# Patient Record
Sex: FEMALE | NOT HISPANIC OR LATINO | URBAN - METROPOLITAN AREA
[De-identification: names, ages, dates, MRNs, and addresses within clinical notes are randomized per-mention and may not be internally consistent; named-entity substitution may affect disease eponyms.]

---

## 2023-10-02 ENCOUNTER — TRANSFERRED RECORDS (OUTPATIENT)
Dept: HEALTH INFORMATION MANAGEMENT | Facility: CLINIC | Age: 58
End: 2023-10-02

## 2023-10-03 ENCOUNTER — TRANSCRIBE ORDERS (OUTPATIENT)
Dept: OTHER | Age: 58
End: 2023-10-03

## 2023-10-03 DIAGNOSIS — R56.9 UNSPECIFIED CONVULSIONS (H): Primary | ICD-10-CM

## 2023-10-12 ENCOUNTER — OFFICE VISIT (OUTPATIENT)
Dept: NEUROLOGY | Facility: CLINIC | Age: 58
End: 2023-10-12
Attending: NURSE PRACTITIONER
Payer: COMMERCIAL

## 2023-10-12 VITALS — TEMPERATURE: 97.3 F | WEIGHT: 165.23 LBS | DIASTOLIC BLOOD PRESSURE: 82 MMHG | SYSTOLIC BLOOD PRESSURE: 135 MMHG

## 2023-10-12 DIAGNOSIS — G40.909 SEIZURE DISORDER (H): Primary | ICD-10-CM

## 2023-10-12 LAB — LEVETIRACETAM SERPL-MCNC: 11.1 ΜG/ML (ref 10–40)

## 2023-10-12 PROCEDURE — 80177 DRUG SCRN QUAN LEVETIRACETAM: CPT | Mod: ORL | Performed by: PSYCHIATRY & NEUROLOGY

## 2023-10-12 RX ORDER — LEVETIRACETAM 250 MG/1
250 TABLET ORAL 2 TIMES DAILY
COMMUNITY
Start: 2023-02-02 | End: 2023-10-12

## 2023-10-12 RX ORDER — LEVETIRACETAM 500 MG/1
500 TABLET ORAL 2 TIMES DAILY
Qty: 60 TABLET | Refills: 5 | Status: SHIPPED | OUTPATIENT
Start: 2023-10-12 | End: 2024-01-18

## 2023-10-12 RX ORDER — ERGOCALCIFEROL 1.25 MG/1
50000 CAPSULE ORAL WEEKLY
COMMUNITY

## 2023-10-12 NOTE — PROGRESS NOTES
"October 12, 2023    Dear Dr. Serg Elizondo,    I had the pleasure of seeing your patient Ms. Sejal Rivas at [facility] epilepsy clinic today. As you know, this patient is a 57 year old years old right handed female presented for evaluation for seizure activities.  Patient is accompanied by her son in the clinic today who also acted as her .  She is being referred for further evaluation and management of her seizures.    Patient started to have seizures about 3 years ago when she was 54 years old when she was in [facility].  She moved to the  in 2022.  She was not treated in [facility]. She started taking Keppra 250 mg bid since Feb. 2023. She is compliant with the meds, she is complaining of some headaches. The son felt that Keppra might have helped.    Patient has 2 types of seizures.  Type # 1 is \"mild seizures\". They are described as her tongue will be twisting, speech is slurred. She is aware of things, she can follow commands. She can communicate. These were happening about 3-6 per day.  None since Sep 17th, 2023. No clear reason for this change.  Type # 2 is \"bigger seizures\".  They are described as she will be confused, humming, making load noises, she will be stiff all over, with tonic clonic movement of the extremities, which will last for 10 min. This is followed by postictal confusion. She had this type about once every 3 months.    TRIGGERS FOR SEIZURES:    None.    RISK FACTORS FOR SEIZURES:  No history of head trauma with loss of consciousness, no history of CNS infection, no history of febrile convulsions.  No history of brain tumor or stroke. Normal development.     ALLERGIES:  Patient has no known allergies.    CURRENT MEDICATIONS:  Current Outpatient Medications   Medication Sig Dispense Refill    ergocalciferol (ERGOCALCIFEROL) 1.25 MG (48707 UT) capsule Take 50,000 Units by mouth once a week      levETIRAcetam (KEPPRA) 250 MG tablet Take 250 mg by mouth 2 times daily         PAST " AEDs:  None    PAST MEDICAL HISTORY:  PTSD  Depression  Anxiety  Back pain    PAST SURGICAL HISTORY:  None    Family History:  History of seizures: None    SOCIAL HISTORY:  Born and raised: [facility].   Education: No school  Tobacco: no, ETOH: no, Drugs: no    Family: , Children: one son in the [place] who is a , all other children are in [facility].    REVIEW OF SYSTEMS:   Negative.       PHYSICAL EXAMINATIONS:     Blood pressure 135/82, temperature 97.3  F (36.3  C), temperature source Temporal, weight 165 lb 3.7 oz (74.9 kg).    General exam: General Appearance:  No acute distress.  HEENT:  Normocephalic, atraumatic.  Neck:  Supple, no lymphadenopathy. Extremities:  No edema, no clubbing, no cyanosis.      Neurologic Exam:  Alert and oriented x 2, not knowing the date.  Speech fluent, appropriate. Normal attention.  Cranial Nerves:  Pupils are equal, round, reactive to light and accomodation.  Extraocular movement intact.  No facial weakness or asymmetry.  Facial sensation was normal.  Tongue and palate midline.  Hearing normal.  Visual field : normal to confrontation.   Motor Exam:  Normal bulk.  Normal tone.  Strength 5/5 in all extremities.  Sensory:  Normal to light touch and vibration in all extremities.  Deep tendon reflexes 2+ bilaterally in both upper and lower extremities.  Coordination:  Finger-to-nose, heel-to-shin exams showed no ataxia.  Rapid alternating movement was normal.  Gait and Station:  normal casual gait.  Normal tendem gait. No difficulties with tip-toe or heel walking.  Romberg sign negative.      PREVIOUS DIAGNOSTIC TESTING:    MRI brain: (1/18/2023)  Impression:  1.  No intracranial mass, hippocampal sclerosis or acute abnormality.  2.  Small bilateral dorsal occipital cortical chronic encephalomalacia.  3.  Mild-to-moderate generalized atrophy.  4.  Moderate nonspecific but presumed chronic microvascular disease in the cerebral white matters. Additional lesser  "involvement at the brainstem with small left pontomesencephalic chronic lacunar infarct.    EEG: (1/5/2023):  Normal.       IMPRESSION:  This patient is a 57 year old years old right handed female presented with seizures for the past 3 years. She has 2 types of seizures, probably focal aware or focal impaired seizures, and focal to bilateral tonic-clonic seizures.  It seems Keppra 250 mg bid helped to reduce the seizures. Will need to increase keppra dose.    The etiology of seizures might be \" Small bilateral dorsal occipital cortical chronic encephalomalacia\" from old stroke.    We would like to have repeat EEG for further evaluation.      PLAN:  Outpatient EEG.  Increase Keppra to 500 mg bid  RTC in 3 months.      Mikayla Teixeira MD      30 min total time was spent on the day of this visit.      20 min was spent on face to face time  10 min was spent on preparation of visit to review charts and labs, ordering medications and tests, and documentation of clinical information  "

## 2023-10-12 NOTE — LETTER
"10/12/2023       RE: Farnaz Rivas  : 1965   MRN: [id number]      Dear Colleague,    Thank you for referring your patient, Farnaz Rivas, to the [facility] EPILEPSY CARE at [facility]. Please see a copy of my visit note below.    2023    Dear Dr. Serg Elizondo,    I had the pleasure of seeing your patient Ms. Sejal Rivas at [facility] epilepsy clinic today. As you know, this patient is a 57 year old years old right handed female presented for evaluation for seizure activities.  Patient is accompanied by her son in the clinic today who also acted as her .  She is being referred for further evaluation and management of her seizures.    Patient started to have seizures about 3 years ago when she was 54 years old when she was in [place].  She moved to the  in .  She was not treated in [facility]. She started taking Keppra 250 mg bid since 2023. She is compliant with the meds, she is complaining of some headaches. The son felt that Keppra might have helped.    Patient has 2 types of seizures.  Type # 1 is \"mild seizures\". They are described as her tongue will be twisting, speech is slurred. She is aware of things, she can follow commands. She can communicate. These were happening about 3-6 per day.  None since Sep 17th, 2023. No clear reason for this change.  Type # 2 is \"bigger seizures\".  They are described as she will be confused, humming, making load noises, she will be stiff all over, with tonic clonic movement of the extremities, which will last for 10 min. This is followed by postictal confusion. She had this type about once every 3 months.    TRIGGERS FOR SEIZURES:    None.    RISK FACTORS FOR SEIZURES:  No history of head trauma with loss of consciousness, no history of CNS infection, no history of febrile convulsions.  No history of brain tumor or stroke. Normal development. "     ALLERGIES:  Patient has no known allergies.    CURRENT MEDICATIONS:  Current Outpatient Medications   Medication Sig Dispense Refill    ergocalciferol (ERGOCALCIFEROL) 1.25 MG (31153 UT) capsule Take 50,000 Units by mouth once a week      levETIRAcetam (KEPPRA) 250 MG tablet Take 250 mg by mouth 2 times daily         PAST AEDs:  None    PAST MEDICAL HISTORY:  PTSD  Depression  Anxiety  Back pain    PAST SURGICAL HISTORY:  None    Family History:  History of seizures: None    SOCIAL HISTORY:  Born and raised: [facility].   Education: No school  Tobacco: no, ETOH: no, Drugs: no    Family: , Children: one son in the [place] who is a , all other children are in [facility].    REVIEW OF SYSTEMS:   Negative.       PHYSICAL EXAMINATIONS:     Blood pressure 135/82, temperature 97.3  F (36.3  C), temperature source Temporal, weight 165 lb 3.7 oz (74.9 kg).    General exam: General Appearance:  No acute distress.  HEENT:  Normocephalic, atraumatic.  Neck:  Supple, no lymphadenopathy. Extremities:  No edema, no clubbing, no cyanosis.      Neurologic Exam:  Alert and oriented x 2, not knowing the date.  Speech fluent, appropriate. Normal attention.  Cranial Nerves:  Pupils are equal, round, reactive to light and accomodation.  Extraocular movement intact.  No facial weakness or asymmetry.  Facial sensation was normal.  Tongue and palate midline.  Hearing normal.  Visual field : normal to confrontation.   Motor Exam:  Normal bulk.  Normal tone.  Strength 5/5 in all extremities.  Sensory:  Normal to light touch and vibration in all extremities.  Deep tendon reflexes 2+ bilaterally in both upper and lower extremities.  Coordination:  Finger-to-nose, heel-to-shin exams showed no ataxia.  Rapid alternating movement was normal.  Gait and Station:  normal casual gait.  Normal tendem gait. No difficulties with tip-toe or heel walking.  Romberg sign negative.      PREVIOUS DIAGNOSTIC TESTING:    MRI brain:  "(1/18/2023)  Impression:  1.  No intracranial mass, hippocampal sclerosis or acute abnormality.  2.  Small bilateral dorsal occipital cortical chronic encephalomalacia.  3.  Mild-to-moderate generalized atrophy.  4.  Moderate nonspecific but presumed chronic microvascular disease in the cerebral white matters. Additional lesser involvement at the brainstem with small left pontomesencephalic chronic lacunar infarct.    EEG: (1/5/2023):  Normal.       IMPRESSION:  This patient is a 57 year old years old right handed female presented with seizures for the past 3 years. She has 2 types of seizures, probably focal aware or focal impaired seizures, and focal to bilateral tonic-clonic seizures.  It seems Keppra 250 mg bid helped to reduce the seizures. Will need to increase keppra dose.    The etiology of seizures might be \" Small bilateral dorsal occipital cortical chronic encephalomalacia\" from old stroke.    We would like to have repeat EEG for further evaluation.      PLAN:  Outpatient EEG.  Increase Keppra to 500 mg bid  RTC in 3 months.      30 min total time was spent on the day of this visit.      20 min was spent on face to face time  10 min was spent on preparation of visit to review charts and labs, ordering medications and tests, and documentation of clinical information      Again, thank you for allowing me to participate in the care of your patient.      Sincerely,    Mikayla Teixeira MD    "

## 2023-11-06 ENCOUNTER — ANCILLARY PROCEDURE (OUTPATIENT)
Dept: NEUROLOGY | Facility: CLINIC | Age: 58
End: 2023-11-06
Attending: NURSE PRACTITIONER
Payer: COMMERCIAL

## 2023-11-06 DIAGNOSIS — G40.909 SEIZURE DISORDER (H): ICD-10-CM

## 2024-01-18 ENCOUNTER — OFFICE VISIT (OUTPATIENT)
Dept: NEUROLOGY | Facility: CLINIC | Age: 59
End: 2024-01-18
Payer: COMMERCIAL

## 2024-01-18 VITALS
DIASTOLIC BLOOD PRESSURE: 88 MMHG | SYSTOLIC BLOOD PRESSURE: 132 MMHG | TEMPERATURE: 97.2 F | HEART RATE: 56 BPM | WEIGHT: 157.2 LBS

## 2024-01-18 DIAGNOSIS — G40.909 SEIZURE DISORDER (H): ICD-10-CM

## 2024-01-18 RX ORDER — LOSARTAN POTASSIUM AND HYDROCHLOROTHIAZIDE 12.5; 5 MG/1; MG/1
1 TABLET ORAL AT BEDTIME
COMMUNITY
Start: 2022-10-29 | End: 2024-01-18

## 2024-01-18 RX ORDER — LEVETIRACETAM 500 MG/1
500 TABLET ORAL 2 TIMES DAILY
Qty: 180 TABLET | Refills: 3 | Status: SHIPPED | OUTPATIENT
Start: 2024-01-18 | End: 2024-07-18

## 2024-01-18 RX ORDER — LOSARTAN POTASSIUM AND HYDROCHLOROTHIAZIDE 12.5; 5 MG/1; MG/1
1 TABLET ORAL AT BEDTIME
Qty: 90 TABLET | Refills: 3 | Status: SHIPPED | OUTPATIENT
Start: 2024-01-18

## 2024-01-18 NOTE — PROGRESS NOTES
"CC: Follow up for seizures.    HPI:  Ms. Sejal Rivas returns for follow up.  She is a 57 year old years old right handed female presented with seizure activities.  There is a Sharelook  on the tablet.     Her daughter in law was with her in the clinic. She was last seen about 3 months ago.  Her keppra was increased to 500 mg bid since the last visit. She had no seizures since the last visit. Her last seizure was about 6-7 months ago. She is compliant with the meds. Her recent EEG was normal.    Patient started to have seizures about 3 years ago when she was 54 years old when she was in [place].  She moved to the  in 2022.  She was not treated in [facility]. She started taking Keppra 250 mg bid since Feb. 2023. She is compliant with the meds, she is complaining of some headaches. The son felt that Keppra might have helped.    Patient has 2 types of seizures.  Type # 1 is \"mild seizures\". They are described as her tongue will be twisting, speech is slurred. She is aware of things, she can follow commands. She can communicate. These were happening about 3-6 per day.  None since Sep 17th, 2023. No clear reason for this change.  Type # 2 is \"bigger seizures\".  They are described as she will be confused, humming, making load noises, she will be stiff all over, with tonic clonic movement of the extremities, which will last for 10 min. This is followed by postictal confusion. She had this type about once every 3 months.    TRIGGERS FOR SEIZURES:    None.    RISK FACTORS FOR SEIZURES:  No history of head trauma with loss of consciousness, no history of CNS infection, no history of febrile convulsions.  No history of brain tumor or stroke. Normal development.     ALLERGIES:  Patient has no known allergies.    CURRENT MEDICATIONS:  Current Outpatient Medications   Medication Sig Dispense Refill     losartan-hydrochlorothiazide (HYZAAR) 50-12.5 MG tablet Take 1 tablet by mouth at bedtime       ergocalciferol " (ERGOCALCIFEROL) 1.25 MG (26086 UT) capsule Take 50,000 Units by mouth once a week       levETIRAcetam (KEPPRA) 500 MG tablet Take 1 tablet (500 mg) by mouth 2 times daily 60 tablet 5       PAST AEDs:  None    PAST MEDICAL HISTORY:  PTSD  Depression  Anxiety  Back pain    PAST SURGICAL HISTORY:  None    Family History:  History of seizures: None    SOCIAL HISTORY:  Born and raised: [facility].   Education: No school  Tobacco: no, ETOH: no, Drugs: no    Family: , Children: one son in the [place] who is a , all other children are in [facility].    REVIEW OF SYSTEMS:   Negative.       PHYSICAL EXAMINATIONS:     Blood pressure 132/88, pulse 56, temperature 97.2  F (36.2  C), temperature source Temporal, weight 157 lb 3.2 oz (71.3 kg).    General exam: General Appearance:  No acute distress.  HEENT:  Normocephalic, atraumatic.  Neck:  Supple, no lymphadenopathy. Extremities:  No edema, no clubbing, no cyanosis.      Neurologic Exam:  Alert and oriented x 2, not knowing the date.  Speech fluent, appropriate. Normal attention.  Cranial Nerves:  Pupils are equal, round, reactive to light and accomodation.  Extraocular movement intact.  No facial weakness or asymmetry.  Facial sensation was normal.  Tongue and palate midline.  Hearing normal.  Visual field : normal to confrontation.   Motor Exam:  Normal bulk.  Normal tone.  Strength 5/5 in all extremities.  Sensory:  Normal to light touch and vibration in all extremities.  Deep tendon reflexes 2+ bilaterally in both upper and lower extremities.  Coordination:  Finger-to-nose, heel-to-shin exams showed no ataxia.  Rapid alternating movement was normal.  Gait and Station:  normal casual gait.  Normal tendem gait. No difficulties with tip-toe or heel walking.  Romberg sign negative.      PREVIOUS DIAGNOSTIC TESTING:    MRI brain: (1/18/2023)  Impression:  1.  No intracranial mass, hippocampal sclerosis or acute abnormality.  2.  Small bilateral dorsal occipital  "cortical chronic encephalomalacia.  3.  Mild-to-moderate generalized atrophy.  4.  Moderate nonspecific but presumed chronic microvascular disease in the cerebral white matters. Additional lesser involvement at the brainstem with small left pontomesencephalic chronic lacunar infarct.    EEG: (1/5/2023):  Normal.       IMPRESSION:      Epilepsy. This patient is a 57 year old years old right handed female presented with seizures for the past 3 years. She has 2 types of seizures, probably focal aware or focal impaired seizures, and focal to bilateral tonic-clonic seizures.  It seems Keppra  helped to control seizures.    The etiology of seizures might be \" Small bilateral dorsal occipital cortical chronic encephalomalacia\" from old stroke.    Her daughter in law was with her in the clinic. She was last seen about 3 months ago.  Her keppra was increased to 500 mg bid since the last visit. She had no seizures since the last visit. Her last seizure was about 6-7 months ago. She is compliant with the meds.     PLAN:  1. Continue Keppra to 500 mg bid. Refilled.  2. RTC in 6 months. If continue to have seizures, may increase keppra. Other options are Lamictal, Vimpat.      30 min total time was spent on the day of this visit.      20 min was spent on face to face time  10 min was spent on preparation of visit to review charts and labs, ordering medications and tests, and documentation of clinical information  "

## 2024-01-18 NOTE — LETTER
"2024       RE: Farnaz Rivas  : 1965   MRN: [id number]      Dear Colleague,    Thank you for referring your patient, Farnaz Rivas, to the [facility] EPILEPSY CARE at . Please see a copy of my visit note below.    CC: Follow up for seizures.    HPI:  Ms. Sejal Rivas returns for follow up.  She is a 57 year old years old right handed female presented with seizure activities.  There is a QuickBlox  on the tablet.     Her daughter in law was with her in the clinic. She was last seen about 3 months ago.  Her keppra was increased to 500 mg bid since the last visit. She had no seizures since the last visit. Her last seizure was about 6-7 months ago. She is compliant with the meds. Her recent EEG was normal.    Patient started to have seizures about 3 years ago when she was 54 years old when she was in [place].  She moved to the  in .  She was not treated in [facility]. She started taking Keppra 250 mg bid since 2023. She is compliant with the meds, she is complaining of some headaches. The son felt that Keppra might have helped.    Patient has 2 types of seizures.  Type # 1 is \"mild seizures\". They are described as her tongue will be twisting, speech is slurred. She is aware of things, she can follow commands. She can communicate. These were happening about 3-6 per day.  None since Sep 17th, 2023. No clear reason for this change.  Type # 2 is \"bigger seizures\".  They are described as she will be confused, humming, making load noises, she will be stiff all over, with tonic clonic movement of the extremities, which will last for 10 min. This is followed by postictal confusion. She had this type about once every 3 months.    TRIGGERS FOR SEIZURES:    None.    RISK FACTORS FOR SEIZURES:  No history of head trauma with loss of consciousness, no history of CNS infection, no history of febrile convulsions.  No " history of brain tumor or stroke. Normal development.     ALLERGIES:  Patient has no known allergies.    CURRENT MEDICATIONS:  Current Outpatient Medications   Medication Sig Dispense Refill    losartan-hydrochlorothiazide (HYZAAR) 50-12.5 MG tablet Take 1 tablet by mouth at bedtime      ergocalciferol (ERGOCALCIFEROL) 1.25 MG (37760 UT) capsule Take 50,000 Units by mouth once a week      levETIRAcetam (KEPPRA) 500 MG tablet Take 1 tablet (500 mg) by mouth 2 times daily 60 tablet 5       PAST AEDs:  None    PAST MEDICAL HISTORY:  PTSD  Depression  Anxiety  Back pain    PAST SURGICAL HISTORY:  None    Family History:  History of seizures: None    SOCIAL HISTORY:  Born and raised: [facility].   Education: No school  Tobacco: no, ETOH: no, Drugs: no    Family: , Children: one son in the  who is a , all other children are in [facility].    REVIEW OF SYSTEMS:   Negative.       PHYSICAL EXAMINATIONS:     Blood pressure 132/88, pulse 56, temperature 97.2  F (36.2  C), temperature source Temporal, weight 157 lb 3.2 oz (71.3 kg).    General exam: General Appearance:  No acute distress.  HEENT:  Normocephalic, atraumatic.  Neck:  Supple, no lymphadenopathy. Extremities:  No edema, no clubbing, no cyanosis.      Neurologic Exam:  Alert and oriented x 2, not knowing the date.  Speech fluent, appropriate. Normal attention.  Cranial Nerves:  Pupils are equal, round, reactive to light and accomodation.  Extraocular movement intact.  No facial weakness or asymmetry.  Facial sensation was normal.  Tongue and palate midline.  Hearing normal.  Visual field : normal to confrontation.   Motor Exam:  Normal bulk.  Normal tone.  Strength 5/5 in all extremities.  Sensory:  Normal to light touch and vibration in all extremities.  Deep tendon reflexes 2+ bilaterally in both upper and lower extremities.  Coordination:  Finger-to-nose, heel-to-shin exams showed no ataxia.  Rapid alternating movement was normal.  Gait and  "Station:  normal casual gait.  Normal tendem gait. No difficulties with tip-toe or heel walking.  Romberg sign negative.      PREVIOUS DIAGNOSTIC TESTING:    MRI brain: (1/18/2023)  Impression:  1.  No intracranial mass, hippocampal sclerosis or acute abnormality.  2.  Small bilateral dorsal occipital cortical chronic encephalomalacia.  3.  Mild-to-moderate generalized atrophy.  4.  Moderate nonspecific but presumed chronic microvascular disease in the cerebral white matters. Additional lesser involvement at the brainstem with small left pontomesencephalic chronic lacunar infarct.    EEG: (1/5/2023):  Normal.       IMPRESSION:      Epilepsy. This patient is a 57 year old years old right handed female presented with seizures for the past 3 years. She has 2 types of seizures, probably focal aware or focal impaired seizures, and focal to bilateral tonic-clonic seizures.  It seems Keppra  helped to control seizures.    The etiology of seizures might be \" Small bilateral dorsal occipital cortical chronic encephalomalacia\" from old stroke.    Her daughter in law was with her in the clinic. She was last seen about 3 months ago.  Her keppra was increased to 500 mg bid since the last visit. She had no seizures since the last visit. Her last seizure was about 6-7 months ago. She is compliant with the meds.     PLAN:  Continue Keppra to 500 mg bid. Refilled.  RTC in 6 months. If continue to have seizures, may increase keppra. Other options are Lamictal, Vimpat.      30 min total time was spent on the day of this visit.      20 min was spent on face to face time  10 min was spent on preparation of visit to review charts and labs, ordering medications and tests, and documentation of clinical information        Again, thank you for allowing me to participate in the care of your patient.      Sincerely,    iMkayla Teixeira MD      "

## 2024-06-07 ENCOUNTER — LAB REQUISITION (OUTPATIENT)
Dept: LAB | Facility: CLINIC | Age: 59
End: 2024-06-07
Payer: COMMERCIAL

## 2024-06-07 DIAGNOSIS — I69.919 UNSPECIFIED SYMPTOMS AND SIGNS INVOLVING COGNITIVE FUNCTIONS FOLLOWING UNSPECIFIED CEREBROVASCULAR DISEASE: ICD-10-CM

## 2024-06-08 ENCOUNTER — LAB REQUISITION (OUTPATIENT)
Dept: LAB | Facility: CLINIC | Age: 59
End: 2024-06-08
Payer: COMMERCIAL

## 2024-06-08 DIAGNOSIS — R79.1 ABNORMAL COAGULATION PROFILE: ICD-10-CM

## 2024-06-08 LAB — INR PPP: 1.04 (ref 0.85–1.15)

## 2024-06-08 PROCEDURE — 85610 PROTHROMBIN TIME: CPT | Mod: ORL | Performed by: INTERNAL MEDICINE

## 2024-06-10 LAB — INR PPP: 1.09 (ref 0.85–1.15)

## 2024-06-10 PROCEDURE — P9604 ONE-WAY ALLOW PRORATED TRIP: HCPCS | Mod: ORL | Performed by: INTERNAL MEDICINE

## 2024-06-10 PROCEDURE — 85610 PROTHROMBIN TIME: CPT | Mod: ORL | Performed by: INTERNAL MEDICINE

## 2024-06-10 PROCEDURE — 36415 COLL VENOUS BLD VENIPUNCTURE: CPT | Mod: ORL | Performed by: INTERNAL MEDICINE

## 2024-06-12 ENCOUNTER — LAB REQUISITION (OUTPATIENT)
Dept: LAB | Facility: CLINIC | Age: 59
End: 2024-06-12
Payer: COMMERCIAL

## 2024-06-12 DIAGNOSIS — D69.6 THROMBOCYTOPENIA, UNSPECIFIED (H): ICD-10-CM

## 2024-06-12 DIAGNOSIS — I69.919 UNSPECIFIED SYMPTOMS AND SIGNS INVOLVING COGNITIVE FUNCTIONS FOLLOWING UNSPECIFIED CEREBROVASCULAR DISEASE: ICD-10-CM

## 2024-06-13 LAB
INR PPP: 1.64 (ref 0.85–1.15)
PLATELET # BLD AUTO: 336 10E3/UL (ref 150–450)

## 2024-06-13 PROCEDURE — 36415 COLL VENOUS BLD VENIPUNCTURE: CPT | Mod: ORL | Performed by: INTERNAL MEDICINE

## 2024-06-13 PROCEDURE — 85049 AUTOMATED PLATELET COUNT: CPT | Mod: ORL | Performed by: INTERNAL MEDICINE

## 2024-06-13 PROCEDURE — P9604 ONE-WAY ALLOW PRORATED TRIP: HCPCS | Mod: ORL | Performed by: INTERNAL MEDICINE

## 2024-06-13 PROCEDURE — 85610 PROTHROMBIN TIME: CPT | Mod: ORL | Performed by: INTERNAL MEDICINE

## 2024-06-14 ENCOUNTER — LAB REQUISITION (OUTPATIENT)
Dept: LAB | Facility: CLINIC | Age: 59
End: 2024-06-14
Payer: COMMERCIAL

## 2024-06-14 DIAGNOSIS — D69.6 THROMBOCYTOPENIA, UNSPECIFIED (H): ICD-10-CM

## 2024-06-14 DIAGNOSIS — I69.910 ATTENTION AND CONCENTRATION DEFICIT FOLLOWING UNSPECIFIED CEREBROVASCULAR DISEASE: ICD-10-CM

## 2024-06-17 LAB
INR PPP: 1.92 (ref 0.85–1.15)
PLATELET # BLD AUTO: 260 10E3/UL (ref 150–450)

## 2024-06-17 PROCEDURE — 85610 PROTHROMBIN TIME: CPT | Mod: ORL | Performed by: INTERNAL MEDICINE

## 2024-06-17 PROCEDURE — P9604 ONE-WAY ALLOW PRORATED TRIP: HCPCS | Mod: ORL | Performed by: INTERNAL MEDICINE

## 2024-06-17 PROCEDURE — 36415 COLL VENOUS BLD VENIPUNCTURE: CPT | Mod: ORL | Performed by: INTERNAL MEDICINE

## 2024-06-17 PROCEDURE — 85049 AUTOMATED PLATELET COUNT: CPT | Mod: ORL | Performed by: INTERNAL MEDICINE

## 2024-06-27 ENCOUNTER — LAB REQUISITION (OUTPATIENT)
Dept: LAB | Facility: CLINIC | Age: 59
End: 2024-06-27
Payer: COMMERCIAL

## 2024-06-27 DIAGNOSIS — I63.9 CEREBRAL INFARCTION, UNSPECIFIED (H): ICD-10-CM

## 2024-06-28 LAB — INR PPP: 0.98 (ref 0.85–1.15)

## 2024-06-28 PROCEDURE — 85610 PROTHROMBIN TIME: CPT | Mod: ORL | Performed by: INTERNAL MEDICINE

## 2024-06-28 PROCEDURE — 36415 COLL VENOUS BLD VENIPUNCTURE: CPT | Mod: ORL | Performed by: INTERNAL MEDICINE

## 2024-06-28 PROCEDURE — P9604 ONE-WAY ALLOW PRORATED TRIP: HCPCS | Mod: ORL | Performed by: INTERNAL MEDICINE

## 2024-07-04 ENCOUNTER — LAB REQUISITION (OUTPATIENT)
Dept: LAB | Facility: CLINIC | Age: 59
End: 2024-07-04
Payer: COMMERCIAL

## 2024-07-04 DIAGNOSIS — I10 ESSENTIAL (PRIMARY) HYPERTENSION: ICD-10-CM

## 2024-07-04 DIAGNOSIS — D64.9 ANEMIA, UNSPECIFIED: ICD-10-CM

## 2024-07-08 LAB
ANION GAP SERPL CALCULATED.3IONS-SCNC: 13 MMOL/L (ref 7–15)
BUN SERPL-MCNC: 14.8 MG/DL (ref 6–20)
CALCIUM SERPL-MCNC: 9.9 MG/DL (ref 8.6–10)
CHLORIDE SERPL-SCNC: 99 MMOL/L (ref 98–107)
CREAT SERPL-MCNC: 0.82 MG/DL (ref 0.51–0.95)
DEPRECATED HCO3 PLAS-SCNC: 25 MMOL/L (ref 22–29)
EGFRCR SERPLBLD CKD-EPI 2021: 82 ML/MIN/1.73M2
GLUCOSE SERPL-MCNC: 156 MG/DL (ref 70–99)
HGB BLD-MCNC: 10.2 G/DL (ref 11.7–15.7)
POTASSIUM SERPL-SCNC: 4.2 MMOL/L (ref 3.4–5.3)
SODIUM SERPL-SCNC: 137 MMOL/L (ref 135–145)

## 2024-07-08 PROCEDURE — P9604 ONE-WAY ALLOW PRORATED TRIP: HCPCS | Mod: ORL

## 2024-07-08 PROCEDURE — 85018 HEMOGLOBIN: CPT | Mod: ORL

## 2024-07-08 PROCEDURE — 80048 BASIC METABOLIC PNL TOTAL CA: CPT | Mod: ORL

## 2024-07-08 PROCEDURE — 36415 COLL VENOUS BLD VENIPUNCTURE: CPT | Mod: ORL

## 2024-07-12 ENCOUNTER — LAB REQUISITION (OUTPATIENT)
Dept: LAB | Facility: CLINIC | Age: 59
End: 2024-07-12
Payer: COMMERCIAL

## 2024-07-12 DIAGNOSIS — D62 ACUTE POSTHEMORRHAGIC ANEMIA: ICD-10-CM

## 2024-07-15 LAB
ANION GAP SERPL CALCULATED.3IONS-SCNC: 11 MMOL/L (ref 7–15)
BUN SERPL-MCNC: 17 MG/DL (ref 6–20)
CALCIUM SERPL-MCNC: 9.6 MG/DL (ref 8.6–10)
CHLORIDE SERPL-SCNC: 105 MMOL/L (ref 98–107)
CREAT SERPL-MCNC: 0.76 MG/DL (ref 0.51–0.95)
EGFRCR SERPLBLD CKD-EPI 2021: 90 ML/MIN/1.73M2
ERYTHROCYTE [DISTWIDTH] IN BLOOD BY AUTOMATED COUNT: 13.9 % (ref 10–15)
GLUCOSE SERPL-MCNC: 140 MG/DL (ref 70–99)
HCO3 SERPL-SCNC: 25 MMOL/L (ref 22–29)
HCT VFR BLD AUTO: 30.8 % (ref 35–47)
HGB BLD-MCNC: 9.3 G/DL (ref 11.7–15.7)
MCH RBC QN AUTO: 26.2 PG (ref 26.5–33)
MCHC RBC AUTO-ENTMCNC: 30.2 G/DL (ref 31.5–36.5)
MCV RBC AUTO: 87 FL (ref 78–100)
PLATELET # BLD AUTO: 315 10E3/UL (ref 150–450)
POTASSIUM SERPL-SCNC: 4.2 MMOL/L (ref 3.4–5.3)
RBC # BLD AUTO: 3.55 10E6/UL (ref 3.8–5.2)
SODIUM SERPL-SCNC: 141 MMOL/L (ref 135–145)
WBC # BLD AUTO: 6.8 10E3/UL (ref 4–11)

## 2024-07-15 PROCEDURE — 80048 BASIC METABOLIC PNL TOTAL CA: CPT | Mod: ORL | Performed by: INTERNAL MEDICINE

## 2024-07-15 PROCEDURE — 36415 COLL VENOUS BLD VENIPUNCTURE: CPT | Mod: ORL | Performed by: INTERNAL MEDICINE

## 2024-07-15 PROCEDURE — 85027 COMPLETE CBC AUTOMATED: CPT | Mod: ORL | Performed by: INTERNAL MEDICINE

## 2024-07-15 PROCEDURE — P9604 ONE-WAY ALLOW PRORATED TRIP: HCPCS | Mod: ORL | Performed by: INTERNAL MEDICINE

## 2024-07-18 ENCOUNTER — VIRTUAL VISIT (OUTPATIENT)
Dept: NEUROLOGY | Facility: CLINIC | Age: 59
End: 2024-07-18
Payer: COMMERCIAL

## 2024-07-18 ENCOUNTER — LAB REQUISITION (OUTPATIENT)
Dept: LAB | Facility: CLINIC | Age: 59
End: 2024-07-18
Payer: COMMERCIAL

## 2024-07-18 DIAGNOSIS — G40.909 SEIZURE DISORDER (H): ICD-10-CM

## 2024-07-18 DIAGNOSIS — M25.50 PAIN IN UNSPECIFIED JOINT: ICD-10-CM

## 2024-07-18 RX ORDER — LEVETIRACETAM 500 MG/1
500 TABLET ORAL 2 TIMES DAILY
Qty: 180 TABLET | Refills: 3 | Status: SHIPPED | OUTPATIENT
Start: 2024-07-18

## 2024-07-18 NOTE — PROGRESS NOTES
"CC: Follow up for seizures.    HPI:  Video call for follow up.  She is a 5 year old years old right handed female with history of seizures.  Her son was with her today on the video visit, and served as . [facility].     She was last seen about 6 months ago.  She had no seizures. She was on Keppra 500 mg bid. She had no seizures since the last visit. She had no seizures since she was on Keppra 500 mg bid.. Last seizures was in summer of 2023. She is compliant with the meds. Her recent EEG was normal.     Unfortunately, she had a stroke on June 3rd. She is still in rehab.  With recent stroke, she had multiple non specific complaints but does have subjective right arm weakness and numbness, as well as possible expressive asphasia. Work up showed a small subacute stroke involving the left MCA territory and severe left ICA stenosis.     Patient started to have seizures about 3 years ago when she was 54 years old when she was in [facility].  She moved to the  in 2022.  She was not treated in [facility]. She started taking Keppra 250 mg bid since Feb. 2023. She is compliant with the meds, she is complaining of some headaches. The son felt that Keppra might have helped.    Patient has 2 types of seizures.  Type # 1 is \"mild seizures\". They are described as her tongue will be twisting, speech is slurred. She is aware of things, she can follow commands. She can communicate. These were happening about 3-6 per day.  None since Sep 17th, 2023. No clear reason for this change.  Type # 2 is \"bigger seizures\".  They are described as she will be confused, humming, making load noises, she will be stiff all over, with tonic clonic movement of the extremities, which will last for 10 min. This is followed by postictal confusion. She had this type about once every 3 months.    TRIGGERS FOR SEIZURES:    None.    RISK FACTORS FOR SEIZURES:  No history of head trauma with loss of " "consciousness, no history of CNS infection, no history of febrile convulsions.  No history of brain tumor or stroke. Normal development.     ALLERGIES:  Patient has no known allergies.    CURRENT MEDICATIONS:  Current Outpatient Medications   Medication Sig Dispense Refill    ergocalciferol (ERGOCALCIFEROL) 1.25 MG (33321 UT) capsule Take 50,000 Units by mouth once a week      levETIRAcetam (KEPPRA) 500 MG tablet Take 1 tablet (500 mg) by mouth 2 times daily 180 tablet 3    losartan-hydrochlorothiazide (HYZAAR) 50-12.5 MG tablet Take 1 tablet by mouth at bedtime 90 tablet 3       PAST AEDs:  None    PAST MEDICAL HISTORY:  PTSD  Depression  Anxiety  Back pain    PAST SURGICAL HISTORY:  None    Family History:  History of seizures: None    SOCIAL HISTORY:  Born and raised: [facility].   Education: No school  Tobacco: no, ETOH: no, Drugs: no    Family: , Children: one son in the  who is a , all other children are in [facility].    REVIEW OF SYSTEMS:   Negative.       PHYSICAL EXAMINATIONS:       AAO x 3, expressive aphasia. Hearing normal. No facial weakness.    PREVIOUS DIAGNOSTIC TESTING:    MRI brain: (1/18/2023)  Impression:  1.  No intracranial mass, hippocampal sclerosis or acute abnormality.  2.  Small bilateral dorsal occipital cortical chronic encephalomalacia.  3.  Mild-to-moderate generalized atrophy.  4.  Moderate nonspecific but presumed chronic microvascular disease in the cerebral white matters. Additional lesser involvement at the brainstem with small left pontomesencephalic chronic lacunar infarct.    EEG: (1/5/2023):  Normal.       IMPRESSION:      1.  Epilepsy. This patient is a 57 year old years old right handed female presented with seizures for the past 3 years. She has 2 types of seizures, probably focal aware or focal impaired seizures, and focal to bilateral tonic-clonic seizures.  It seems Keppra  helped to control seizures.    The etiology of seizures might be \" Small " "bilateral dorsal occipital cortical chronic encephalomalacia\" from old stroke.    She was last seen about 6 months ago.  She had no seizures. She was on Keppra 500 mg bid. She had no seizures since the last visit. She had no seizures since she was on Keppra 500 mg bid.. Last seizures was in summer of 2023. She is compliant with the meds. Her recent EEG was normal.     2. Stroke. With recent stroke, she had multiple non specific complaints but does have subjective right arm weakness and numbness, as well as possible expressive asphasia. Work up showed a small subacute stroke involving the left MCA territory and severe left ICA stenosis.     PLAN:  Continue Keppra to 500 mg bid. Refilled.  RTC in 6 months. If continue to have seizures, may increase keppra. Other options are Lamictal, Vimpat.      The longitudinal plan of care for seizures was addressed during this visit. Due to the added complexity in care, I will continue to support this patient in the subsequent management of this condition and with the ongoing continuity of care of this condition.       33 min total time was spent on the day of this visit.      25 min was spent on face to face time  8 min was spent on preparation of visit to review charts and labs, ordering medications and tests, and documentation of clinical information  "

## 2024-07-18 NOTE — LETTER
"2024       RE: Farnaz Rivas  : 1965   MRN: [id number]      Dear Colleague,    Thank you for referring your patient, Farnaz Rivas, to the [facility] EPILEPSY CARE at [facility]. Please see a copy of my visit note below.    CC: Follow up for seizures.    HPI:  Video call for follow up.  She is a 5 year old years old right handed female with history of seizures.  Her son was with her today on the video visit, and served as . There is a [employer] ([facility])  on the tablet.     She was last seen about 6 months ago.  She had no seizures. She was on Keppra 500 mg bid. She had no seizures since the last visit. She had no seizures since she was on Keppra 500 mg bid.. Last seizures was in summer of 2023. She is compliant with the meds. Her recent EEG was normal.     Unfortunately, she had a stroke on . She is still in rehab.  With recent stroke, she had multiple non specific complaints but does have subjective right arm weakness and numbness, as well as possible expressive asphasia. Work up showed a small subacute stroke involving the left MCA territory and severe left ICA stenosis.     Patient started to have seizures about 3 years ago when she was 54 years old when she was in [place].  She moved to the  in .  She was not treated in [facility]. She started taking Keppra 250 mg bid since 2023. She is compliant with the meds, she is complaining of some headaches. The son felt that Keppra might have helped.    Patient has 2 types of seizures.  Type # 1 is \"mild seizures\". They are described as her tongue will be twisting, speech is slurred. She is aware of things, she can follow commands. She can communicate. These were happening about 3-6 per day.  None since Sep 17th, 2023. No clear reason for this change.  Type # 2 is \"bigger seizures\".  They are described as she will be confused, humming, " making load noises, she will be stiff all over, with tonic clonic movement of the extremities, which will last for 10 min. This is followed by postictal confusion. She had this type about once every 3 months.    TRIGGERS FOR SEIZURES:    None.    RISK FACTORS FOR SEIZURES:  No history of head trauma with loss of consciousness, no history of CNS infection, no history of febrile convulsions.  No history of brain tumor or stroke. Normal development.     ALLERGIES:  Patient has no known allergies.    CURRENT MEDICATIONS:  Current Outpatient Medications   Medication Sig Dispense Refill     ergocalciferol (ERGOCALCIFEROL) 1.25 MG (78319 UT) capsule Take 50,000 Units by mouth once a week       levETIRAcetam (KEPPRA) 500 MG tablet Take 1 tablet (500 mg) by mouth 2 times daily 180 tablet 3     losartan-hydrochlorothiazide (HYZAAR) 50-12.5 MG tablet Take 1 tablet by mouth at bedtime 90 tablet 3       PAST AEDs:  None    PAST MEDICAL HISTORY:  PTSD  Depression  Anxiety  Back pain    PAST SURGICAL HISTORY:  None    Family History:  History of seizures: None    SOCIAL HISTORY:  Born and raised: [facility].   Education: No school  Tobacco: no, ETOH: no, Drugs: no    Family: , Children: one son in the  who is a , all other children are in [facility].    REVIEW OF SYSTEMS:   Negative.       PHYSICAL EXAMINATIONS:       AAO x 3, expressive aphasia. Hearing normal. No facial weakness.    PREVIOUS DIAGNOSTIC TESTING:    MRI brain: (1/18/2023)  Impression:  1.  No intracranial mass, hippocampal sclerosis or acute abnormality.  2.  Small bilateral dorsal occipital cortical chronic encephalomalacia.  3.  Mild-to-moderate generalized atrophy.  4.  Moderate nonspecific but presumed chronic microvascular disease in the cerebral white matters. Additional lesser involvement at the brainstem with small left pontomesencephalic chronic lacunar infarct.    EEG: (1/5/2023):  Normal.       IMPRESSION:      1.  Epilepsy. This  "patient is a 57 year old years old right handed female presented with seizures for the past 3 years. She has 2 types of seizures, probably focal aware or focal impaired seizures, and focal to bilateral tonic-clonic seizures.  It seems Keppra  helped to control seizures.    The etiology of seizures might be \" Small bilateral dorsal occipital cortical chronic encephalomalacia\" from old stroke.    She was last seen about 6 months ago.  She had no seizures. She was on Keppra 500 mg bid. She had no seizures since the last visit. She had no seizures since she was on Keppra 500 mg bid.. Last seizures was in summer of 2023. She is compliant with the meds. Her recent EEG was normal.     2. Stroke. With recent stroke, she had multiple non specific complaints but does have subjective right arm weakness and numbness, as well as possible expressive asphasia. Work up showed a small subacute stroke involving the left MCA territory and severe left ICA stenosis.     PLAN:  Continue Keppra to 500 mg bid. Refilled.  RTC in 6 months. If continue to have seizures, may increase keppra. Other options are Lamictal, Vimpat.      The longitudinal plan of care for seizures was addressed during this visit. Due to the added complexity in care, I will continue to support this patient in the subsequent management of this condition and with the ongoing continuity of care of this condition.       33 min total time was spent on the day of this visit.      25 min was spent on face to face time  8 min was spent on preparation of visit to review charts and labs, ordering medications and tests, and documentation of clinical information      Again, thank you for allowing me to participate in the care of your patient.      Sincerely,    Mikayla Teixeira MD    "

## 2024-07-19 LAB
CRP SERPL-MCNC: <3 MG/L
ERYTHROCYTE [SEDIMENTATION RATE] IN BLOOD BY WESTERGREN METHOD: 47 MM/HR (ref 0–30)
HBV SURFACE AB SERPL IA-ACNC: <3.5 M[IU]/ML
HBV SURFACE AB SERPL IA-ACNC: NONREACTIVE M[IU]/ML
HCV AB SERPL QL IA: REACTIVE
RHEUMATOID FACT SERPL-ACNC: <10 IU/ML
TSH SERPL DL<=0.005 MIU/L-ACNC: 2.92 UIU/ML (ref 0.3–4.2)
URATE SERPL-MCNC: 5.2 MG/DL (ref 2.4–5.7)

## 2024-07-19 PROCEDURE — 85652 RBC SED RATE AUTOMATED: CPT | Mod: ORL | Performed by: INTERNAL MEDICINE

## 2024-07-19 PROCEDURE — 86200 CCP ANTIBODY: CPT | Mod: ORL | Performed by: INTERNAL MEDICINE

## 2024-07-19 PROCEDURE — P9604 ONE-WAY ALLOW PRORATED TRIP: HCPCS | Mod: ORL | Performed by: INTERNAL MEDICINE

## 2024-07-19 PROCEDURE — 86706 HEP B SURFACE ANTIBODY: CPT | Mod: ORL | Performed by: INTERNAL MEDICINE

## 2024-07-19 PROCEDURE — 84443 ASSAY THYROID STIM HORMONE: CPT | Mod: ORL | Performed by: INTERNAL MEDICINE

## 2024-07-19 PROCEDURE — 86140 C-REACTIVE PROTEIN: CPT | Mod: ORL | Performed by: INTERNAL MEDICINE

## 2024-07-19 PROCEDURE — 36415 COLL VENOUS BLD VENIPUNCTURE: CPT | Mod: ORL | Performed by: INTERNAL MEDICINE

## 2024-07-19 PROCEDURE — 86431 RHEUMATOID FACTOR QUANT: CPT | Mod: ORL | Performed by: INTERNAL MEDICINE

## 2024-07-19 PROCEDURE — 86803 HEPATITIS C AB TEST: CPT | Mod: ORL | Performed by: INTERNAL MEDICINE

## 2024-07-19 PROCEDURE — 86038 ANTINUCLEAR ANTIBODIES: CPT | Mod: ORL | Performed by: INTERNAL MEDICINE

## 2024-07-19 PROCEDURE — 84550 ASSAY OF BLOOD/URIC ACID: CPT | Mod: ORL | Performed by: INTERNAL MEDICINE

## 2024-07-22 LAB
ANA SER QL IF: NEGATIVE
CCP AB SER IA-ACNC: 1.2 U/ML

## 2024-07-26 ENCOUNTER — LAB REQUISITION (OUTPATIENT)
Dept: LAB | Facility: CLINIC | Age: 59
End: 2024-07-26
Payer: COMMERCIAL

## 2024-07-26 DIAGNOSIS — D64.9 ANEMIA, UNSPECIFIED: ICD-10-CM

## 2024-07-29 LAB
ANION GAP SERPL CALCULATED.3IONS-SCNC: 16 MMOL/L (ref 7–15)
BUN SERPL-MCNC: 16.6 MG/DL (ref 6–20)
CALCIUM SERPL-MCNC: 9.8 MG/DL (ref 8.8–10.4)
CHLORIDE SERPL-SCNC: 105 MMOL/L (ref 98–107)
CREAT SERPL-MCNC: 0.66 MG/DL (ref 0.51–0.95)
EGFRCR SERPLBLD CKD-EPI 2021: >90 ML/MIN/1.73M2
ERYTHROCYTE [DISTWIDTH] IN BLOOD BY AUTOMATED COUNT: 14.6 % (ref 10–15)
GLUCOSE SERPL-MCNC: 139 MG/DL (ref 70–99)
HCO3 SERPL-SCNC: 16 MMOL/L (ref 22–29)
HCT VFR BLD AUTO: 31.5 % (ref 35–47)
HGB BLD-MCNC: 9.6 G/DL (ref 11.7–15.7)
MCH RBC QN AUTO: 25.7 PG (ref 26.5–33)
MCHC RBC AUTO-ENTMCNC: 30.5 G/DL (ref 31.5–36.5)
MCV RBC AUTO: 85 FL (ref 78–100)
PLATELET # BLD AUTO: 288 10E3/UL (ref 150–450)
POTASSIUM SERPL-SCNC: 5.1 MMOL/L (ref 3.4–5.3)
RBC # BLD AUTO: 3.73 10E6/UL (ref 3.8–5.2)
SODIUM SERPL-SCNC: 137 MMOL/L (ref 135–145)
WBC # BLD AUTO: 6.8 10E3/UL (ref 4–11)

## 2024-07-29 PROCEDURE — 36415 COLL VENOUS BLD VENIPUNCTURE: CPT | Mod: ORL | Performed by: INTERNAL MEDICINE

## 2024-07-29 PROCEDURE — 80048 BASIC METABOLIC PNL TOTAL CA: CPT | Mod: ORL | Performed by: INTERNAL MEDICINE

## 2024-07-29 PROCEDURE — 85027 COMPLETE CBC AUTOMATED: CPT | Mod: ORL | Performed by: INTERNAL MEDICINE

## 2024-07-29 PROCEDURE — P9604 ONE-WAY ALLOW PRORATED TRIP: HCPCS | Mod: ORL | Performed by: INTERNAL MEDICINE

## 2024-10-16 ENCOUNTER — LAB REQUISITION (OUTPATIENT)
Dept: LAB | Facility: CLINIC | Age: 59
End: 2024-10-16
Payer: COMMERCIAL

## 2024-10-16 DIAGNOSIS — D64.9 ANEMIA, UNSPECIFIED: ICD-10-CM

## 2024-10-16 DIAGNOSIS — I10 ESSENTIAL (PRIMARY) HYPERTENSION: ICD-10-CM

## 2024-10-21 LAB
ANION GAP SERPL CALCULATED.3IONS-SCNC: 13 MMOL/L (ref 7–15)
BUN SERPL-MCNC: 23.7 MG/DL (ref 6–20)
CALCIUM SERPL-MCNC: 10.1 MG/DL (ref 8.8–10.4)
CHLORIDE SERPL-SCNC: 103 MMOL/L (ref 98–107)
CREAT SERPL-MCNC: 0.66 MG/DL (ref 0.51–0.95)
EGFRCR SERPLBLD CKD-EPI 2021: >90 ML/MIN/1.73M2
ERYTHROCYTE [DISTWIDTH] IN BLOOD BY AUTOMATED COUNT: 15.9 % (ref 10–15)
GLUCOSE SERPL-MCNC: 148 MG/DL (ref 70–99)
HCO3 SERPL-SCNC: 25 MMOL/L (ref 22–29)
HCT VFR BLD AUTO: 35.4 % (ref 35–47)
HGB BLD-MCNC: 11.1 G/DL (ref 11.7–15.7)
MCH RBC QN AUTO: 26.5 PG (ref 26.5–33)
MCHC RBC AUTO-ENTMCNC: 31.4 G/DL (ref 31.5–36.5)
MCV RBC AUTO: 85 FL (ref 78–100)
PLATELET # BLD AUTO: 313 10E3/UL (ref 150–450)
POTASSIUM SERPL-SCNC: 4.4 MMOL/L (ref 3.4–5.3)
RBC # BLD AUTO: 4.19 10E6/UL (ref 3.8–5.2)
SODIUM SERPL-SCNC: 141 MMOL/L (ref 135–145)
WBC # BLD AUTO: 6.6 10E3/UL (ref 4–11)

## 2024-10-21 PROCEDURE — 85027 COMPLETE CBC AUTOMATED: CPT | Mod: ORL | Performed by: INTERNAL MEDICINE

## 2024-10-21 PROCEDURE — 80048 BASIC METABOLIC PNL TOTAL CA: CPT | Mod: ORL | Performed by: INTERNAL MEDICINE

## 2024-10-21 PROCEDURE — P9604 ONE-WAY ALLOW PRORATED TRIP: HCPCS | Mod: ORL | Performed by: INTERNAL MEDICINE

## 2024-10-21 PROCEDURE — 36415 COLL VENOUS BLD VENIPUNCTURE: CPT | Mod: ORL | Performed by: INTERNAL MEDICINE

## 2024-11-04 ENCOUNTER — LAB REQUISITION (OUTPATIENT)
Dept: LAB | Facility: CLINIC | Age: 59
End: 2024-11-04
Payer: COMMERCIAL

## 2024-11-04 DIAGNOSIS — C16.8 MALIGNANT NEOPLASM OF OVERLAPPING SITES OF STOMACH (H): ICD-10-CM

## 2024-11-06 ENCOUNTER — LAB REQUISITION (OUTPATIENT)
Dept: LAB | Facility: CLINIC | Age: 59
End: 2024-11-06
Payer: COMMERCIAL

## 2024-11-06 DIAGNOSIS — C16.8 MALIGNANT NEOPLASM OF OVERLAPPING SITES OF STOMACH (H): ICD-10-CM

## 2024-11-07 ENCOUNTER — LAB REQUISITION (OUTPATIENT)
Dept: LAB | Facility: CLINIC | Age: 59
End: 2024-11-07
Payer: COMMERCIAL

## 2024-11-07 DIAGNOSIS — C16.8 MALIGNANT NEOPLASM OF OVERLAPPING SITES OF STOMACH (H): ICD-10-CM

## 2024-11-08 LAB
BASOPHILS # BLD AUTO: 0 10E3/UL (ref 0–0.2)
BASOPHILS NFR BLD AUTO: 1 %
EOSINOPHIL # BLD AUTO: 0.2 10E3/UL (ref 0–0.7)
EOSINOPHIL NFR BLD AUTO: 3 %
ERYTHROCYTE [DISTWIDTH] IN BLOOD BY AUTOMATED COUNT: 15.6 % (ref 10–15)
HCT VFR BLD AUTO: 33 % (ref 35–47)
HGB BLD-MCNC: 10.1 G/DL (ref 11.7–15.7)
IMM GRANULOCYTES # BLD: 0 10E3/UL
IMM GRANULOCYTES NFR BLD: 0 %
LYMPHOCYTES # BLD AUTO: 1.9 10E3/UL (ref 0.8–5.3)
LYMPHOCYTES NFR BLD AUTO: 31 %
MCH RBC QN AUTO: 27.2 PG (ref 26.5–33)
MCHC RBC AUTO-ENTMCNC: 30.6 G/DL (ref 31.5–36.5)
MCV RBC AUTO: 89 FL (ref 78–100)
MONOCYTES # BLD AUTO: 0.3 10E3/UL (ref 0–1.3)
MONOCYTES NFR BLD AUTO: 4 %
NEUTROPHILS # BLD AUTO: 3.7 10E3/UL (ref 1.6–8.3)
NEUTROPHILS NFR BLD AUTO: 60 %
NRBC # BLD AUTO: 0 10E3/UL
NRBC BLD AUTO-RTO: 0 /100
PLATELET # BLD AUTO: 235 10E3/UL (ref 150–450)
RBC # BLD AUTO: 3.71 10E6/UL (ref 3.8–5.2)
WBC # BLD AUTO: 6.2 10E3/UL (ref 4–11)

## 2024-11-08 PROCEDURE — P9603 ONE-WAY ALLOW PRORATED MILES: HCPCS | Mod: ORL | Performed by: INTERNAL MEDICINE

## 2024-11-08 PROCEDURE — 85025 COMPLETE CBC W/AUTO DIFF WBC: CPT | Mod: ORL | Performed by: INTERNAL MEDICINE

## 2024-11-08 PROCEDURE — 36415 COLL VENOUS BLD VENIPUNCTURE: CPT | Mod: ORL | Performed by: INTERNAL MEDICINE

## 2024-11-12 ENCOUNTER — LAB REQUISITION (OUTPATIENT)
Dept: LAB | Facility: CLINIC | Age: 59
End: 2024-11-12
Payer: COMMERCIAL

## 2024-11-12 DIAGNOSIS — Z01.818 ENCOUNTER FOR OTHER PREPROCEDURAL EXAMINATION: ICD-10-CM

## 2024-11-13 LAB
ALBUMIN SERPL BCG-MCNC: 4.1 G/DL (ref 3.5–5.2)
ALP SERPL-CCNC: 66 U/L (ref 40–150)
ALT SERPL W P-5'-P-CCNC: 21 U/L (ref 0–50)
ANION GAP SERPL CALCULATED.3IONS-SCNC: 10 MMOL/L (ref 7–15)
AST SERPL W P-5'-P-CCNC: 26 U/L (ref 0–45)
BILIRUB SERPL-MCNC: 0.2 MG/DL
BUN SERPL-MCNC: 15.3 MG/DL (ref 6–20)
CALCIUM SERPL-MCNC: 9.9 MG/DL (ref 8.8–10.4)
CHLORIDE SERPL-SCNC: 105 MMOL/L (ref 98–107)
CREAT SERPL-MCNC: 0.85 MG/DL (ref 0.51–0.95)
EGFRCR SERPLBLD CKD-EPI 2021: 79 ML/MIN/1.73M2
ERYTHROCYTE [DISTWIDTH] IN BLOOD BY AUTOMATED COUNT: 15.9 % (ref 10–15)
GLUCOSE SERPL-MCNC: 128 MG/DL (ref 70–99)
HCO3 SERPL-SCNC: 25 MMOL/L (ref 22–29)
HCT VFR BLD AUTO: 33.1 % (ref 35–47)
HGB BLD-MCNC: 10.4 G/DL (ref 11.7–15.7)
INR PPP: 0.92 (ref 0.85–1.15)
MCH RBC QN AUTO: 27.4 PG (ref 26.5–33)
MCHC RBC AUTO-ENTMCNC: 31.4 G/DL (ref 31.5–36.5)
MCV RBC AUTO: 87 FL (ref 78–100)
PLATELET # BLD AUTO: 212 10E3/UL (ref 150–450)
POTASSIUM SERPL-SCNC: 4.2 MMOL/L (ref 3.4–5.3)
PROT SERPL-MCNC: 6.9 G/DL (ref 6.4–8.3)
RBC # BLD AUTO: 3.79 10E6/UL (ref 3.8–5.2)
SODIUM SERPL-SCNC: 140 MMOL/L (ref 135–145)
WBC # BLD AUTO: 5 10E3/UL (ref 4–11)

## 2024-11-13 PROCEDURE — 80053 COMPREHEN METABOLIC PANEL: CPT | Mod: ORL | Performed by: REGISTERED NURSE

## 2024-11-13 PROCEDURE — 36415 COLL VENOUS BLD VENIPUNCTURE: CPT | Mod: ORL | Performed by: REGISTERED NURSE

## 2024-11-13 PROCEDURE — 85027 COMPLETE CBC AUTOMATED: CPT | Mod: ORL | Performed by: REGISTERED NURSE

## 2024-11-13 PROCEDURE — P9604 ONE-WAY ALLOW PRORATED TRIP: HCPCS | Mod: ORL | Performed by: REGISTERED NURSE

## 2024-11-13 PROCEDURE — 85610 PROTHROMBIN TIME: CPT | Mod: ORL | Performed by: REGISTERED NURSE

## 2024-12-11 ENCOUNTER — LAB REQUISITION (OUTPATIENT)
Dept: LAB | Facility: CLINIC | Age: 59
End: 2024-12-11
Payer: COMMERCIAL

## 2024-12-11 DIAGNOSIS — R53.1 WEAKNESS: ICD-10-CM

## 2024-12-12 LAB
ANION GAP SERPL CALCULATED.3IONS-SCNC: 8 MMOL/L (ref 7–15)
BUN SERPL-MCNC: 11.2 MG/DL (ref 8–23)
CALCIUM SERPL-MCNC: 9.7 MG/DL (ref 8.8–10.4)
CHLORIDE SERPL-SCNC: 105 MMOL/L (ref 98–107)
CREAT SERPL-MCNC: 0.83 MG/DL (ref 0.51–0.95)
EGFRCR SERPLBLD CKD-EPI 2021: 81 ML/MIN/1.73M2
ERYTHROCYTE [DISTWIDTH] IN BLOOD BY AUTOMATED COUNT: 18.3 % (ref 10–15)
GLUCOSE SERPL-MCNC: 124 MG/DL (ref 70–99)
HCO3 SERPL-SCNC: 26 MMOL/L (ref 22–29)
HCT VFR BLD AUTO: 29.6 % (ref 35–47)
HGB BLD-MCNC: 9.5 G/DL (ref 11.7–15.7)
MCH RBC QN AUTO: 29.7 PG (ref 26.5–33)
MCHC RBC AUTO-ENTMCNC: 32.1 G/DL (ref 31.5–36.5)
MCV RBC AUTO: 93 FL (ref 78–100)
PLATELET # BLD AUTO: 263 10E3/UL (ref 150–450)
POTASSIUM SERPL-SCNC: 4.2 MMOL/L (ref 3.4–5.3)
RBC # BLD AUTO: 3.2 10E6/UL (ref 3.8–5.2)
SODIUM SERPL-SCNC: 139 MMOL/L (ref 135–145)
WBC # BLD AUTO: 6 10E3/UL (ref 4–11)

## 2024-12-12 PROCEDURE — 36415 COLL VENOUS BLD VENIPUNCTURE: CPT | Mod: ORL | Performed by: REGISTERED NURSE

## 2024-12-12 PROCEDURE — P9604 ONE-WAY ALLOW PRORATED TRIP: HCPCS | Mod: ORL | Performed by: REGISTERED NURSE

## 2024-12-12 PROCEDURE — 85027 COMPLETE CBC AUTOMATED: CPT | Mod: ORL | Performed by: REGISTERED NURSE

## 2024-12-12 PROCEDURE — 80048 BASIC METABOLIC PNL TOTAL CA: CPT | Mod: ORL | Performed by: REGISTERED NURSE

## 2025-01-20 ENCOUNTER — VIRTUAL VISIT (OUTPATIENT)
Dept: NEUROLOGY | Facility: CLINIC | Age: 60
End: 2025-01-20
Payer: COMMERCIAL

## 2025-01-20 DIAGNOSIS — G40.909 SEIZURE DISORDER (H): ICD-10-CM

## 2025-01-20 RX ORDER — LEVETIRACETAM 500 MG/1
500 TABLET ORAL 2 TIMES DAILY
Qty: 180 TABLET | Refills: 3 | Status: SHIPPED | OUTPATIENT
Start: 2025-01-20

## 2025-01-20 NOTE — PATIENT INSTRUCTIONS
PLAN:  Continue Keppra to 500 mg bid. Refilled.  RTC in 6 months. If continue to have seizures, may increase keppra. Other options are Lamictal, Vimpat.

## 2025-01-20 NOTE — LETTER
"2025       RE: Farnaz Rivas  : 1965   MRN: [id number]      Dear Colleague,    Thank you for referring your patient, Farnaz Rivas, to the [facility] [place], operated by [facility] EPILEPSY CARE at [facility]. Please see a copy of my visit note below.    CC: Follow up for seizures.    HPI:  Video call for follow up.  She is a 59 year old years old right handed female with history of seizures.  Her son was with her today on the video visit, and served as . There is a [place] ([facility])  on the tablet.     She was last seen about 6 months ago.  She had no seizures. She is currently on Keppra 500 mg bid. She had no seizures since she was on Keppra 500 mg bid. Last seizures was in summer of 2023. She is compliant with the meds. No concerns, no issues. Her recent EEG was normal.     Unfortunately, she had a stroke on 2024. She is still in rehab.  With recent stroke, she had multiple non specific complaints but does have subjective right arm weakness and numbness, as well as possible expressive asphasia. Work up showed a small subacute stroke involving the left MCA territory and severe left ICA stenosis.     Patient started to have seizures about 3 years ago when she was 54 years old when she was in [place].  She moved to the  in .  She was not treated in [facility]. She started taking Keppra 250 mg bid since 2023. She is compliant with the meds, she is complaining of some headaches. The son felt that Keppra might have helped.    Patient has 2 types of seizures.  Type # 1 is \"mild seizures\". They are described as her tongue will be twisting, speech is slurred. She is aware of things, she can follow commands. She can communicate. These were happening about 3-6 per day.  None since Sep 17th, 2023. No clear reason for this change.  Type # 2 is \"bigger seizures\".  They are described as she will be confused, humming, " making load noises, she will be stiff all over, with tonic clonic movement of the extremities, which will last for 10 min. This is followed by postictal confusion. She had this type about once every 3 months.    TRIGGERS FOR SEIZURES:    None.    RISK FACTORS FOR SEIZURES:  No history of head trauma with loss of consciousness, no history of CNS infection, no history of febrile convulsions.  No history of brain tumor or stroke. Normal development.     ALLERGIES:  Patient has no known allergies.    CURRENT MEDICATIONS:  Current Outpatient Medications   Medication Sig Dispense Refill     ergocalciferol (ERGOCALCIFEROL) 1.25 MG (51123 UT) capsule Take 50,000 Units by mouth once a week       levETIRAcetam (KEPPRA) 500 MG tablet Take 1 tablet (500 mg) by mouth 2 times daily 180 tablet 3     losartan-hydrochlorothiazide (HYZAAR) 50-12.5 MG tablet Take 1 tablet by mouth at bedtime 90 tablet 3       PAST AEDs:  None    PAST MEDICAL HISTORY:  PTSD  Depression  Anxiety  Back pain    PAST SURGICAL HISTORY:  None    Family History:  History of seizures: None    SOCIAL HISTORY:  Born and raised: [facility].   Education: No school  Tobacco: no, ETOH: no, Drugs: no    Family: , Children: one son in the  who is a , all other children are in [facility].    REVIEW OF SYSTEMS:   Negative.       PHYSICAL EXAMINATIONS:       AAO x 3, expressive aphasia. Hearing normal. No facial weakness.    PREVIOUS DIAGNOSTIC TESTING:    MRI brain: (1/18/2023)  Impression:  1.  No intracranial mass, hippocampal sclerosis or acute abnormality.  2.  Small bilateral dorsal occipital cortical chronic encephalomalacia.  3.  Mild-to-moderate generalized atrophy.  4.  Moderate nonspecific but presumed chronic microvascular disease in the cerebral white matters. Additional lesser involvement at the brainstem with small left pontomesencephalic chronic lacunar infarct.    EEG: (1/5/2023):  Normal.       IMPRESSION:      1.  Epilepsy. This  "patient is a 57 year old years old right handed female presented with seizures for the past 3 years. She has 2 types of seizures, probably focal aware or focal impaired seizures, and focal to bilateral tonic-clonic seizures.  It seems Keppra  helped to control seizures.    The etiology of seizures might be \" Small bilateral dorsal occipital cortical chronic encephalomalacia\" from old stroke.    She was last seen about 6 months ago.  She had no seizures. She is currently on Keppra 500 mg bid. She had no seizures since she was on Keppra 500 mg bid. Last seizures was in summer of 2023. She is compliant with the meds. No concerns, no issues.    2. Stroke. With recent stroke, she had multiple non specific complaints but does have subjective right arm weakness and numbness, as well as possible expressive asphasia. Work up showed a small subacute stroke involving the left MCA territory and severe left ICA stenosis.     PLAN:  Continue Keppra to 500 mg bid. Refilled.  RTC in 6 months. If continue to have seizures, may increase keppra. Other options are Lamictal, Vimpat.      The longitudinal plan of care for seizures was addressed during this visit. Due to the added complexity in care, I will continue to support this patient in the subsequent management of this condition and with the ongoing continuity of care of this condition.       33 min total time was spent on the day of this visit.      25 min was spent on face to face time  8 min was spent on preparation of visit to review charts and labs, ordering medications and tests, and documentation of clinical information      Again, thank you for allowing me to participate in the care of your patient.      Sincerely,    Mikayla Teixeira MD    "

## 2025-01-20 NOTE — PROGRESS NOTES
"CC: Follow up for seizures.    HPI:  Video call for follow up.  She is a 59 year old years old right handed female with history of seizures.  Her son was with her today on the video visit, and served as . [facility].     She was last seen about 6 months ago.  She had no seizures. She is currently on Keppra 500 mg bid. She had no seizures since she was on Keppra 500 mg bid. Last seizures was in summer of 2023. She is compliant with the meds. No concerns, no issues. Her recent EEG was normal.     Unfortunately, she had a stroke on June 3rd, 2024. She is still in rehab.  With recent stroke, she had multiple non specific complaints but does have subjective right arm weakness and numbness, as well as possible expressive asphasia. Work up showed a small subacute stroke involving the left MCA territory and severe left ICA stenosis.     Patient started to have seizures about 3 years ago when she was 54 years old when she was in [facility].  She moved to the  in 2022.  She was not treated in [facility]. She started taking Keppra 250 mg bid since Feb. 2023. She is compliant with the meds, she is complaining of some headaches. The son felt that Keppra might have helped.    Patient has 2 types of seizures.  Type # 1 is \"mild seizures\". They are described as her tongue will be twisting, speech is slurred. She is aware of things, she can follow commands. She can communicate. These were happening about 3-6 per day.  None since Sep 17th, 2023. No clear reason for this change.  Type # 2 is \"bigger seizures\".  They are described as she will be confused, humming, making load noises, she will be stiff all over, with tonic clonic movement of the extremities, which will last for 10 min. This is followed by postictal confusion. She had this type about once every 3 months.    TRIGGERS FOR SEIZURES:    None.    RISK FACTORS FOR SEIZURES:  No history of head trauma with loss of " "consciousness, no history of CNS infection, no history of febrile convulsions.  No history of brain tumor or stroke. Normal development.     ALLERGIES:  Patient has no known allergies.    CURRENT MEDICATIONS:  Current Outpatient Medications   Medication Sig Dispense Refill    ergocalciferol (ERGOCALCIFEROL) 1.25 MG (22938 UT) capsule Take 50,000 Units by mouth once a week      levETIRAcetam (KEPPRA) 500 MG tablet Take 1 tablet (500 mg) by mouth 2 times daily 180 tablet 3    losartan-hydrochlorothiazide (HYZAAR) 50-12.5 MG tablet Take 1 tablet by mouth at bedtime 90 tablet 3       PAST AEDs:  None    PAST MEDICAL HISTORY:  PTSD  Depression  Anxiety  Back pain    PAST SURGICAL HISTORY:  None    Family History:  History of seizures: None    SOCIAL HISTORY:  Born and raised: [facility].   Education: No school  Tobacco: no, ETOH: no, Drugs: no    Family: , Children: one son in the  who is a , all other children are in [facility].    REVIEW OF SYSTEMS:   Negative.       PHYSICAL EXAMINATIONS:       AAO x 3, expressive aphasia. Hearing normal. No facial weakness.    PREVIOUS DIAGNOSTIC TESTING:    MRI brain: (1/18/2023)  Impression:  1.  No intracranial mass, hippocampal sclerosis or acute abnormality.  2.  Small bilateral dorsal occipital cortical chronic encephalomalacia.  3.  Mild-to-moderate generalized atrophy.  4.  Moderate nonspecific but presumed chronic microvascular disease in the cerebral white matters. Additional lesser involvement at the brainstem with small left pontomesencephalic chronic lacunar infarct.    EEG: (1/5/2023):  Normal.       IMPRESSION:      1.  Epilepsy. This patient is a 57 year old years old right handed female presented with seizures for the past 3 years. She has 2 types of seizures, probably focal aware or focal impaired seizures, and focal to bilateral tonic-clonic seizures.  It seems Keppra  helped to control seizures.    The etiology of seizures might be \" Small " "bilateral dorsal occipital cortical chronic encephalomalacia\" from old stroke.    She was last seen about 6 months ago.  She had no seizures. She is currently on Keppra 500 mg bid. She had no seizures since she was on Keppra 500 mg bid. Last seizures was in summer of 2023. She is compliant with the meds. No concerns, no issues.    2. Stroke. With recent stroke, she had multiple non specific complaints but does have subjective right arm weakness and numbness, as well as possible expressive asphasia. Work up showed a small subacute stroke involving the left MCA territory and severe left ICA stenosis.     PLAN:  Continue Keppra to 500 mg bid. Refilled.  RTC in 6 months. If continue to have seizures, may increase keppra. Other options are Lamictal, Vimpat.      The longitudinal plan of care for seizures was addressed during this visit. Due to the added complexity in care, I will continue to support this patient in the subsequent management of this condition and with the ongoing continuity of care of this condition.       33 min total time was spent on the day of this visit.      25 min was spent on face to face time  8 min was spent on preparation of visit to review charts and labs, ordering medications and tests, and documentation of clinical information  "

## 2025-02-20 ENCOUNTER — LAB REQUISITION (OUTPATIENT)
Dept: LAB | Facility: CLINIC | Age: 60
End: 2025-02-20
Payer: COMMERCIAL

## 2025-02-20 DIAGNOSIS — E11.8 TYPE 2 DIABETES MELLITUS WITH UNSPECIFIED COMPLICATIONS (H): ICD-10-CM

## 2025-02-21 LAB
ANION GAP SERPL CALCULATED.3IONS-SCNC: 13 MMOL/L (ref 7–15)
BUN SERPL-MCNC: 13.4 MG/DL (ref 8–23)
CALCIUM SERPL-MCNC: 9.9 MG/DL (ref 8.8–10.4)
CHLORIDE SERPL-SCNC: 104 MMOL/L (ref 98–107)
CREAT SERPL-MCNC: 0.79 MG/DL (ref 0.51–0.95)
EGFRCR SERPLBLD CKD-EPI 2021: 86 ML/MIN/1.73M2
ERYTHROCYTE [DISTWIDTH] IN BLOOD BY AUTOMATED COUNT: 13.1 % (ref 10–15)
EST. AVERAGE GLUCOSE BLD GHB EST-MCNC: 131 MG/DL
GLUCOSE SERPL-MCNC: 172 MG/DL (ref 70–99)
HBA1C MFR BLD: 6.2 %
HCO3 SERPL-SCNC: 22 MMOL/L (ref 22–29)
HCT VFR BLD AUTO: 30.2 % (ref 35–47)
HGB BLD-MCNC: 10 G/DL (ref 11.7–15.7)
MCH RBC QN AUTO: 32.2 PG (ref 26.5–33)
MCHC RBC AUTO-ENTMCNC: 33.1 G/DL (ref 31.5–36.5)
MCV RBC AUTO: 97 FL (ref 78–100)
PLATELET # BLD AUTO: 242 10E3/UL (ref 150–450)
POTASSIUM SERPL-SCNC: 4.4 MMOL/L (ref 3.4–5.3)
RBC # BLD AUTO: 3.11 10E6/UL (ref 3.8–5.2)
SODIUM SERPL-SCNC: 139 MMOL/L (ref 135–145)
WBC # BLD AUTO: 5.7 10E3/UL (ref 4–11)

## 2025-02-21 PROCEDURE — 83036 HEMOGLOBIN GLYCOSYLATED A1C: CPT | Mod: ORL | Performed by: REGISTERED NURSE

## 2025-02-21 PROCEDURE — 80048 BASIC METABOLIC PNL TOTAL CA: CPT | Mod: ORL | Performed by: REGISTERED NURSE

## 2025-02-21 PROCEDURE — 36415 COLL VENOUS BLD VENIPUNCTURE: CPT | Mod: ORL | Performed by: REGISTERED NURSE

## 2025-02-21 PROCEDURE — 85027 COMPLETE CBC AUTOMATED: CPT | Mod: ORL | Performed by: REGISTERED NURSE

## 2025-02-21 PROCEDURE — P9603 ONE-WAY ALLOW PRORATED MILES: HCPCS | Mod: ORL | Performed by: REGISTERED NURSE

## 2025-05-08 ENCOUNTER — LAB REQUISITION (OUTPATIENT)
Dept: LAB | Facility: CLINIC | Age: 60
End: 2025-05-08
Payer: COMMERCIAL

## 2025-05-08 DIAGNOSIS — E11.9 TYPE 2 DIABETES MELLITUS WITHOUT COMPLICATIONS (H): ICD-10-CM

## 2025-05-08 DIAGNOSIS — E11.8 TYPE 2 DIABETES MELLITUS WITH UNSPECIFIED COMPLICATIONS (H): ICD-10-CM

## 2025-05-09 LAB
ANION GAP SERPL CALCULATED.3IONS-SCNC: 10 MMOL/L (ref 7–15)
BUN SERPL-MCNC: 13.1 MG/DL (ref 8–23)
CALCIUM SERPL-MCNC: 9.8 MG/DL (ref 8.8–10.4)
CHLORIDE SERPL-SCNC: 103 MMOL/L (ref 98–107)
CREAT SERPL-MCNC: 0.74 MG/DL (ref 0.51–0.95)
EGFRCR SERPLBLD CKD-EPI 2021: >90 ML/MIN/1.73M2
ERYTHROCYTE [DISTWIDTH] IN BLOOD BY AUTOMATED COUNT: 11.9 % (ref 10–15)
GLUCOSE SERPL-MCNC: 108 MG/DL (ref 70–99)
HCO3 SERPL-SCNC: 26 MMOL/L (ref 22–29)
HCT VFR BLD AUTO: 33.3 % (ref 35–47)
HGB BLD-MCNC: 10.5 G/DL (ref 11.7–15.7)
MCH RBC QN AUTO: 28.8 PG (ref 26.5–33)
MCHC RBC AUTO-ENTMCNC: 31.5 G/DL (ref 31.5–36.5)
MCV RBC AUTO: 92 FL (ref 78–100)
PLATELET # BLD AUTO: 238 10E3/UL (ref 150–450)
POTASSIUM SERPL-SCNC: 4.1 MMOL/L (ref 3.4–5.3)
RBC # BLD AUTO: 3.64 10E6/UL (ref 3.8–5.2)
SODIUM SERPL-SCNC: 139 MMOL/L (ref 135–145)
WBC # BLD AUTO: 5.6 10E3/UL (ref 4–11)

## 2025-05-09 PROCEDURE — 85027 COMPLETE CBC AUTOMATED: CPT | Mod: ORL | Performed by: REGISTERED NURSE

## 2025-05-09 PROCEDURE — 80048 BASIC METABOLIC PNL TOTAL CA: CPT | Mod: ORL | Performed by: REGISTERED NURSE

## 2025-05-09 PROCEDURE — P9604 ONE-WAY ALLOW PRORATED TRIP: HCPCS | Mod: ORL | Performed by: REGISTERED NURSE

## 2025-05-09 PROCEDURE — 36415 COLL VENOUS BLD VENIPUNCTURE: CPT | Mod: ORL | Performed by: REGISTERED NURSE

## 2025-06-05 ENCOUNTER — LAB REQUISITION (OUTPATIENT)
Dept: LAB | Facility: CLINIC | Age: 60
End: 2025-06-05
Payer: COMMERCIAL

## 2025-06-05 DIAGNOSIS — K92.1 MELENA: ICD-10-CM

## 2025-06-06 LAB
ERYTHROCYTE [DISTWIDTH] IN BLOOD BY AUTOMATED COUNT: 12.3 % (ref 10–15)
HCT VFR BLD AUTO: 31.7 % (ref 35–47)
HGB BLD-MCNC: 10.2 G/DL (ref 11.7–15.7)
MCH RBC QN AUTO: 29 PG (ref 26.5–33)
MCHC RBC AUTO-ENTMCNC: 32.2 G/DL (ref 31.5–36.5)
MCV RBC AUTO: 90 FL (ref 78–100)
PLATELET # BLD AUTO: 264 10E3/UL (ref 150–450)
RBC # BLD AUTO: 3.52 10E6/UL (ref 3.8–5.2)
WBC # BLD AUTO: 5.1 10E3/UL (ref 4–11)

## 2025-06-06 PROCEDURE — 36415 COLL VENOUS BLD VENIPUNCTURE: CPT | Mod: ORL | Performed by: REGISTERED NURSE

## 2025-06-06 PROCEDURE — 85027 COMPLETE CBC AUTOMATED: CPT | Mod: ORL | Performed by: REGISTERED NURSE

## 2025-06-06 PROCEDURE — P9604 ONE-WAY ALLOW PRORATED TRIP: HCPCS | Mod: ORL | Performed by: REGISTERED NURSE

## 2025-06-20 ENCOUNTER — LAB REQUISITION (OUTPATIENT)
Dept: LAB | Facility: CLINIC | Age: 60
End: 2025-06-20
Payer: COMMERCIAL

## 2025-06-20 DIAGNOSIS — E11.9 TYPE 2 DIABETES MELLITUS WITHOUT COMPLICATIONS (H): ICD-10-CM

## 2025-06-23 LAB
ANION GAP SERPL CALCULATED.3IONS-SCNC: 11 MMOL/L (ref 7–15)
BUN SERPL-MCNC: 16 MG/DL (ref 8–23)
CALCIUM SERPL-MCNC: 9.3 MG/DL (ref 8.8–10.4)
CHLORIDE SERPL-SCNC: 107 MMOL/L (ref 98–107)
CREAT SERPL-MCNC: 0.72 MG/DL (ref 0.51–0.95)
EGFRCR SERPLBLD CKD-EPI 2021: >90 ML/MIN/1.73M2
ERYTHROCYTE [DISTWIDTH] IN BLOOD BY AUTOMATED COUNT: 13.5 % (ref 10–15)
EST. AVERAGE GLUCOSE BLD GHB EST-MCNC: 143 MG/DL
GLUCOSE SERPL-MCNC: 120 MG/DL (ref 70–99)
HBA1C MFR BLD: 6.6 %
HCO3 SERPL-SCNC: 23 MMOL/L (ref 22–29)
HCT VFR BLD AUTO: 30.7 % (ref 35–47)
HGB BLD-MCNC: 9.6 G/DL (ref 11.7–15.7)
MCH RBC QN AUTO: 28.6 PG (ref 26.5–33)
MCHC RBC AUTO-ENTMCNC: 31.3 G/DL (ref 31.5–36.5)
MCV RBC AUTO: 91 FL (ref 78–100)
PLATELET # BLD AUTO: 206 10E3/UL (ref 150–450)
POTASSIUM SERPL-SCNC: 4 MMOL/L (ref 3.4–5.3)
RBC # BLD AUTO: 3.36 10E6/UL (ref 3.8–5.2)
SODIUM SERPL-SCNC: 141 MMOL/L (ref 135–145)
WBC # BLD AUTO: 6.8 10E3/UL (ref 4–11)

## 2025-06-23 PROCEDURE — P9604 ONE-WAY ALLOW PRORATED TRIP: HCPCS | Mod: ORL | Performed by: REGISTERED NURSE

## 2025-06-23 PROCEDURE — 80048 BASIC METABOLIC PNL TOTAL CA: CPT | Mod: ORL | Performed by: REGISTERED NURSE

## 2025-06-23 PROCEDURE — 85027 COMPLETE CBC AUTOMATED: CPT | Mod: ORL | Performed by: REGISTERED NURSE

## 2025-06-23 PROCEDURE — 36415 COLL VENOUS BLD VENIPUNCTURE: CPT | Mod: ORL | Performed by: REGISTERED NURSE

## 2025-06-23 PROCEDURE — 83036 HEMOGLOBIN GLYCOSYLATED A1C: CPT | Mod: ORL | Performed by: REGISTERED NURSE
